# Patient Record
(demographics unavailable — no encounter records)

---

## 2017-04-03 NOTE — TELEPHONE ENCOUNTER
[facility]-Needs yearly OV and med was d/c'd last year due for increase change of clot/stroke  Russ Aparicio RN, BSN

## 2017-04-03 NOTE — TELEPHONE ENCOUNTER
Pending Prescriptions:                       Disp   Refills    raloxifene (EVISTA) 60 MG tablet [Pharmac*30 tab*4            Sig: TAKE 1 TABLET (60 MG) BY MOUTH DAILY      Last Written Prescription Date: 01/15/2016  Last Fill Quantity: 30, # refills: 4  Last Office Visit with G, P or [facility] prescribing provider: 01/26/2016       DEXA Scan:  No order of DX HIP/PELVIS/SPINE is found.     No order of DX HIP/PELVIS/SPINE W LAT FRACTION ANALYSIS is found.       No results found for: SUSAN Marquez

## 2017-04-04 RX ORDER — RALOXIFENE HYDROCHLORIDE 60 MG/1
TABLET, FILM COATED ORAL
Qty: 30 TABLET | Refills: 4 | OUTPATIENT
Start: 2017-04-04

## 2021-12-11 NOTE — TELEPHONE ENCOUNTER
Med is d/c will call pt/family to see why requesting refill and advise appt needed.       [facility]   Last Written Prescription Date: 1/15/16  Last Fill Quantity: 30  # refills: 0  Last Office Visit with [facility], [facility] or [facility] prescribing provider: 1/15/16                                               Left without waiting for d/c paperwork, given shelter resources